# Patient Record
Sex: MALE | Race: WHITE | Employment: OTHER | URBAN - METROPOLITAN AREA
[De-identification: names, ages, dates, MRNs, and addresses within clinical notes are randomized per-mention and may not be internally consistent; named-entity substitution may affect disease eponyms.]

---

## 2019-11-06 ENCOUNTER — HOSPITAL ENCOUNTER (OUTPATIENT)
Dept: ULTRASOUND IMAGING | Age: 64
Discharge: HOME | End: 2019-11-08
Payer: COMMERCIAL

## 2019-11-06 DIAGNOSIS — I82.402 ACUTE DEEP VEIN THROMBOSIS (DVT) OF LEFT LOWER EXTREMITY, UNSPECIFIED VEIN (HCC): ICD-10-CM

## 2019-11-06 PROCEDURE — 93971 EXTREMITY STUDY: CPT

## 2019-12-15 ENCOUNTER — HOSPITAL ENCOUNTER (OUTPATIENT)
Dept: MRI IMAGING | Age: 64
Discharge: HOME | End: 2019-12-17
Payer: COMMERCIAL

## 2019-12-15 DIAGNOSIS — M23.92 DERANGEMENT OF LEFT KNEE: ICD-10-CM

## 2019-12-15 PROCEDURE — 73721 MRI JNT OF LWR EXTRE W/O DYE: CPT

## 2020-07-01 ENCOUNTER — NURSE ONLY (OUTPATIENT)
Dept: PRIMARY CARE CLINIC | Age: 65
End: 2020-07-01

## 2020-07-01 ENCOUNTER — HOSPITAL ENCOUNTER (OUTPATIENT)
Dept: GENERAL RADIOLOGY | Age: 65
Discharge: HOME | End: 2020-07-03
Payer: COMMERCIAL

## 2020-07-01 PROCEDURE — 71046 X-RAY EXAM CHEST 2 VIEWS: CPT

## 2020-07-01 PROCEDURE — U0003 INFECTIOUS AGENT DETECTION BY NUCLEIC ACID (DNA OR RNA); SEVERE ACUTE RESPIRATORY SYNDROME CORONAVIRUS 2 (SARS-COV-2) (CORONAVIRUS DISEASE [COVID-19]), AMPLIFIED PROBE TECHNIQUE, MAKING USE OF HIGH THROUGHPUT TECHNOLOGIES AS DESCRIBED BY CMS-2020-01-R: HCPCS

## 2020-07-03 LAB
SARS-COV-2: NOT DETECTED
SOURCE: NORMAL

## 2024-03-21 ENCOUNTER — HOSPITAL ENCOUNTER (OUTPATIENT)
Dept: RADIOLOGY | Facility: CLINIC | Age: 69
Discharge: HOME | End: 2024-03-21
Payer: MEDICARE

## 2024-03-21 ENCOUNTER — OFFICE VISIT (OUTPATIENT)
Dept: ORTHOPEDIC SURGERY | Facility: CLINIC | Age: 69
End: 2024-03-21
Payer: MEDICARE

## 2024-03-21 DIAGNOSIS — M25.562 LEFT KNEE PAIN, UNSPECIFIED CHRONICITY: ICD-10-CM

## 2024-03-21 DIAGNOSIS — M17.12 PRIMARY OSTEOARTHRITIS OF LEFT KNEE: Primary | ICD-10-CM

## 2024-03-21 PROCEDURE — 99213 OFFICE O/P EST LOW 20 MIN: CPT | Performed by: ORTHOPAEDIC SURGERY

## 2024-03-21 PROCEDURE — 73560 X-RAY EXAM OF KNEE 1 OR 2: CPT | Mod: LEFT SIDE | Performed by: ORTHOPAEDIC SURGERY

## 2024-03-21 PROCEDURE — 73560 X-RAY EXAM OF KNEE 1 OR 2: CPT | Mod: LT

## 2024-03-21 NOTE — PROGRESS NOTES
History of present illness: Left knee osteoarthritis did well with gel shots for 10 months    Physical exam:    General: No acute distress or breathing difficulty or discomfort, pleasant and cooperative with the examination.    Extremities: The affected left knee was examined and inspected and was tender to touch along the medial and lateral aspect with catching, locking or mechanical symptoms.    The skin was intact without breakdown or open wound.  Old incisions of present were healed.    There was a mild Ronnie exam seen with some evidence of instability and weakness in the collateral ligaments with varus valgus stressing and laxity in the anterior or posterior planes.    There was a negative Lachman's test pivot shift test and posterior drawer sign with no foot drop, numbness or tingling.    Sensation, reflexes and pulses in the foot and ankle are preserved.  There was an effusion.  Range of motion showed good straight leg raise with flexion to 150 degrees and extension to 0 degrees.  The patient had the ability to bear weight but with discomfort.  The patient's gait was antalgic secondary to discomfort    Diagnostic studies: X-rays show advanced osteoarthritic change left knee    Impression: Left knee advanced arthritis    Plan: Having done well for 10 months we will reorder the 3 shot Gelsyn-3 packet we will look forward to seeing him back for gel injections

## 2024-04-15 ENCOUNTER — OFFICE VISIT (OUTPATIENT)
Dept: ORTHOPEDIC SURGERY | Facility: CLINIC | Age: 69
End: 2024-04-15
Payer: MEDICARE

## 2024-04-15 DIAGNOSIS — M17.12 PRIMARY OSTEOARTHRITIS OF LEFT KNEE: Primary | ICD-10-CM

## 2024-04-15 PROCEDURE — 20610 DRAIN/INJ JOINT/BURSA W/O US: CPT | Mod: LT | Performed by: ORTHOPAEDIC SURGERY

## 2024-04-15 PROCEDURE — 2500000004 HC RX 250 GENERAL PHARMACY W/ HCPCS (ALT 636 FOR OP/ED): Mod: JZ | Performed by: ORTHOPAEDIC SURGERY

## 2024-04-15 RX ADMIN — Medication 2 ML: at 16:27

## 2024-04-15 NOTE — PROGRESS NOTES
Before the left injection the benefits of a hyaluronic acid  injection including infection, local skin irritation, skin atrophy, calcification, continued pain and discomfort, elevated blood sugar, burning, failure to relieve pain, possible late infection were discussed with the patient.    Postprocedure discomfort can be alleviated with additional medications, ice, elevation, rest over the first 24 hours as recommended.    Patient verbalized understanding and wanted to proceed with the planned procedure.    After informed consent was provided and allergies verified, the patient was positioned appropriately on the bed.  The LEFT KNEE to be injected was prepped and draped in a sterile fashion.  The skin was anesthetized with ethyl chloride spray.   A  22-gauge needle was used to inject the appropriate joint.    Joint injection was performed with Gelsyn 3 contains 16.8 mg of hyaluronic acid per 2 mL was performed.  The needle was removed and the puncture site closed and sealed with a Band-Aid.  The patient tolerated the procedure well.  L Inj/Asp: L knee on 4/15/2024 4:27 PM  Indications: pain  Details: 22 G needle, medial approach  Medications: 2 mL sodium hyaluronate 16.8 mg/2 mL  Outcome: tolerated well, no immediate complications  Procedure, treatment alternatives, risks and benefits explained, specific risks discussed. Consent was given by the patient. Immediately prior to procedure a time out was called to verify the correct patient, procedure, equipment, support staff and site/side marked as required. Patient was prepped and draped in the usual sterile fashion.

## 2024-04-22 ENCOUNTER — OFFICE VISIT (OUTPATIENT)
Dept: ORTHOPEDIC SURGERY | Facility: CLINIC | Age: 69
End: 2024-04-22
Payer: MEDICARE

## 2024-04-22 DIAGNOSIS — M17.12 PRIMARY OSTEOARTHRITIS OF LEFT KNEE: Primary | ICD-10-CM

## 2024-04-22 PROCEDURE — 2500000004 HC RX 250 GENERAL PHARMACY W/ HCPCS (ALT 636 FOR OP/ED): Mod: JZ | Performed by: ORTHOPAEDIC SURGERY

## 2024-04-22 PROCEDURE — 20610 DRAIN/INJ JOINT/BURSA W/O US: CPT | Mod: LT | Performed by: ORTHOPAEDIC SURGERY

## 2024-04-22 RX ADMIN — Medication 2 ML: at 15:40

## 2024-04-22 NOTE — PROGRESS NOTES
Before the left injection the benefits of a hyaluronic acid  injection including infection, local skin irritation, skin atrophy, calcification, continued pain and discomfort, elevated blood sugar, burning, failure to relieve pain, possible late infection were discussed with the patient.    Postprocedure discomfort can be alleviated with additional medications, ice, elevation, rest over the first 24 hours as recommended.    Patient verbalized understanding and wanted to proceed with the planned procedure.    After informed consent was provided and allergies verified, the patient was positioned appropriately on the bed.  The LEFT KNEE to be injected was prepped and draped in a sterile fashion.  The skin was anesthetized with ethyl chloride spray.   A  22-gauge needle was used to inject the appropriate joint.    Joint injection was performed with Gelsyn 3 contains 16.8 mg of hyaluronic acid per 2 mL was performed.  The needle was removed and the puncture site closed and sealed with a Band-Aid.  The patient tolerated the procedure well.  L Inj/Asp: L knee on 4/22/2024 3:40 PM  Indications: pain  Details: 22 G needle, medial approach  Medications: 2 mL sodium hyaluronate 16.8 mg/2 mL  Outcome: tolerated well, no immediate complications  Procedure, treatment alternatives, risks and benefits explained, specific risks discussed. Consent was given by the patient. Immediately prior to procedure a time out was called to verify the correct patient, procedure, equipment, support staff and site/side marked as required. Patient was prepped and draped in the usual sterile fashion.

## 2024-04-29 ENCOUNTER — OFFICE VISIT (OUTPATIENT)
Dept: ORTHOPEDIC SURGERY | Facility: CLINIC | Age: 69
End: 2024-04-29
Payer: MEDICARE

## 2024-04-29 DIAGNOSIS — M17.12 PRIMARY OSTEOARTHRITIS OF LEFT KNEE: Primary | ICD-10-CM

## 2024-04-29 PROCEDURE — 2500000004 HC RX 250 GENERAL PHARMACY W/ HCPCS (ALT 636 FOR OP/ED): Mod: JZ | Performed by: ORTHOPAEDIC SURGERY

## 2024-04-29 PROCEDURE — 20610 DRAIN/INJ JOINT/BURSA W/O US: CPT | Mod: LT | Performed by: ORTHOPAEDIC SURGERY

## 2024-04-29 RX ADMIN — Medication 2 ML: at 15:40

## 2024-04-29 NOTE — PROGRESS NOTES
Before the left injection the benefits of a hyaluronic acid  injection including infection, local skin irritation, skin atrophy, calcification, continued pain and discomfort, elevated blood sugar, burning, failure to relieve pain, possible late infection were discussed with the patient.    Postprocedure discomfort can be alleviated with additional medications, ice, elevation, rest over the first 24 hours as recommended.    Patient verbalized understanding and wanted to proceed with the planned procedure.    After informed consent was provided and allergies verified, the patient was positioned appropriately on the bed.  The LEFT KNEE to be injected was prepped and draped in a sterile fashion.  The skin was anesthetized with ethyl chloride spray.   A  22-gauge needle was used to inject the appropriate joint.    Joint injection was performed with Gelsyn 3 contains 16.8 mg of hyaluronic acid per 2 mL was performed.  The needle was removed and the puncture site closed and sealed with a Band-Aid.  The patient tolerated the procedure well.  L Inj/Asp: L knee on 4/29/2024 3:40 PM  Indications: pain  Details: 22 G needle, medial approach  Medications: 2 mL sodium hyaluronate 16.8 mg/2 mL  Outcome: tolerated well, no immediate complications  Procedure, treatment alternatives, risks and benefits explained, specific risks discussed. Consent was given by the patient. Immediately prior to procedure a time out was called to verify the correct patient, procedure, equipment, support staff and site/side marked as required. Patient was prepped and draped in the usual sterile fashion.

## 2024-05-13 ENCOUNTER — HOSPITAL ENCOUNTER (OUTPATIENT)
Dept: RADIOLOGY | Facility: CLINIC | Age: 69
Discharge: HOME | End: 2024-05-13
Payer: MEDICARE

## 2024-05-13 ENCOUNTER — OFFICE VISIT (OUTPATIENT)
Dept: ORTHOPEDIC SURGERY | Facility: CLINIC | Age: 69
End: 2024-05-13
Payer: MEDICARE

## 2024-05-13 DIAGNOSIS — M77.8 TENDINITIS OF RIGHT SHOULDER: Primary | ICD-10-CM

## 2024-05-13 DIAGNOSIS — M25.511 ACUTE PAIN OF RIGHT SHOULDER: ICD-10-CM

## 2024-05-13 PROCEDURE — 99213 OFFICE O/P EST LOW 20 MIN: CPT | Performed by: INTERNAL MEDICINE

## 2024-05-13 PROCEDURE — 73030 X-RAY EXAM OF SHOULDER: CPT | Mod: RIGHT SIDE | Performed by: INTERNAL MEDICINE

## 2024-05-13 PROCEDURE — 73030 X-RAY EXAM OF SHOULDER: CPT | Mod: RT

## 2024-05-13 RX ORDER — PREDNISONE 50 MG/1
50 TABLET ORAL DAILY
Qty: 5 TABLET | Refills: 0 | Status: SHIPPED | OUTPATIENT
Start: 2024-05-13 | End: 2024-05-18

## 2024-05-13 NOTE — PROGRESS NOTES
Acute Injury New Patient Visit    CC:   Chief Complaint   Patient presents with    Right Shoulder - Pain       HPI: Brandan is a 69 y.o. male presents today for evaluation for acute right shoulder pain which started last week Thursday. He denies any trauma or fall. He has a history of gout and diabetes. He is here for initial evaluation and x-rays.        Review of Systems   GENERAL: Negative for malaise, significant weight loss, fever  MUSCULOSKELETAL: See HPI  NEURO:  Negative for numbness / tingling     Past Medical History  Past Medical History:   Diagnosis Date    Personal history of other diseases of the circulatory system     History of hypertension    Personal history of other endocrine, nutritional and metabolic disease     History of hyperlipidemia       Medication review  Medication Documentation Review Audit    **Prior to Admission medications have not yet been reviewed**         Allergies  Not on File    Social History  Social History     Socioeconomic History    Marital status:      Spouse name: Not on file    Number of children: Not on file    Years of education: Not on file    Highest education level: Not on file   Occupational History    Not on file   Tobacco Use    Smoking status: Not on file    Smokeless tobacco: Not on file   Substance and Sexual Activity    Alcohol use: Not on file    Drug use: Not on file    Sexual activity: Not on file   Other Topics Concern    Not on file   Social History Narrative    Not on file     Social Determinants of Health     Financial Resource Strain: Not on file   Food Insecurity: Not on file   Transportation Needs: Not on file   Physical Activity: Not on file   Stress: Not on file   Social Connections: Not on file   Intimate Partner Violence: Not on file   Housing Stability: Not on file       Surgical History  Past Surgical History:   Procedure Laterality Date    OTHER SURGICAL HISTORY  07/06/2020    No history of surgery    US ASPIRATION INJECTION  INTERMEDIATE JOINT  11/8/2019    US ASPIRATION INJECTION INTERMEDIATE JOINT 11/8/2019 [facility]    US ASPIRATION INJECTION INTERMEDIATE JOINT  1/8/2020    US ASPIRATION INJECTION INTERMEDIATE JOINT 1/8/2020 [facility]    US ASPIRATION INJECTION INTERMEDIATE JOINT  1/15/2020    US ASPIRATION INJECTION INTERMEDIATE JOINT 1/15/2020 [facility]    US ASPIRATION INJECTION INTERMEDIATE JOINT  1/22/2020    US ASPIRATION INJECTION INTERMEDIATE JOINT 1/22/2020 [facility]       Physical Exam:  GENERAL:  Patient is awake, alert, and oriented to person place and time.  Patient appears well nourished and well kept.  Affect Calm, Not Acutely Distressed.  HEENT:  Normocephalic, Atraumatic, EOMI  CARDIOVASCULAR:  Hemodynamically stable.  RESPIRATORY:  Normal respirations with unlabored breathing.  Extremity: Right shoulder shows skin is intact.  There is no erythema or warmth.  There is no clinical signs of infection.  Can forward flex to 180 degrees.  Abduction to 180 degrees.  External rotation from neutral at 75 degrees.  Internal rotation at the level of T10.  There are signs of impingement with Beck and Neer's test.  Negative empty can test.  Positive crossarm test.  There is mild pain over the AC joint.  Negative [place]'s test.  Negative sulcus sign.  Negative anterior apprehension's test.  Neurovascularly intact.    Cervical spine examination shows skin is intact.  There is no cervical midline tenderness.  He can forward flex and reverse extend with no pain discomfort.  Some mild pain with left and right lateral rotation.  Pain over the right trapezial.  Negative Spurling test.  He is neurovascular intact.      Diagnostics: X-rays reviewed      Procedure: None    Assessment:   Right shoulder rotator cuff tendonitis  Right trapezius strain    Plan: Brandan presents today for initial evaluation for acute right shoulder pain which started last week Thursday. No trauma or fall. X-rays  showed no obvious fractures. We recommended oral prednisone 50 mg for 5 days for acute inflammation. He will follow-up in 10-14 days for reevaluation. If no improvement, possible subacromial corticosteroid injection, may also consider some physical therapy.    Orders Placed This Encounter    XR shoulder right 2+ views      At the conclusion of the visit there were no further questions by the patient/family regarding their plan of care.  Patient was instructed to call or return with any issues, questions, or concerns regarding their injury and/or treatment plan described above.     05/13/24 at 10:31 AM - Debra Stewart MD  Scribe Attestation  By signing my name below, I, Drew Prathermo, Scribe   attest that this documentation has been prepared under the direction and in the presence of Debra Stewart MD.    Office: [phone]    This note was prepared using voice recognition software.  The details of this note are correct and have been reviewed, and corrected to the best of my ability.  Some grammatical errors may persist related to the Dragon software.

## 2024-05-23 ENCOUNTER — APPOINTMENT (OUTPATIENT)
Dept: ORTHOPEDIC SURGERY | Facility: CLINIC | Age: 69
End: 2024-05-23
Payer: MEDICARE